# Patient Record
Sex: MALE | HISPANIC OR LATINO
[De-identification: names, ages, dates, MRNs, and addresses within clinical notes are randomized per-mention and may not be internally consistent; named-entity substitution may affect disease eponyms.]

---

## 2021-11-16 ENCOUNTER — NON-APPOINTMENT (OUTPATIENT)
Age: 36
End: 2021-11-16

## 2021-11-16 PROBLEM — Z00.00 ENCOUNTER FOR PREVENTIVE HEALTH EXAMINATION: Status: ACTIVE | Noted: 2021-11-16

## 2021-11-18 ENCOUNTER — NON-APPOINTMENT (OUTPATIENT)
Age: 36
End: 2021-11-18

## 2021-11-23 ENCOUNTER — APPOINTMENT (OUTPATIENT)
Dept: COLORECTAL SURGERY | Facility: CLINIC | Age: 36
End: 2021-11-23
Payer: MEDICARE

## 2021-11-23 VITALS
HEART RATE: 82 BPM | WEIGHT: 235 LBS | BODY MASS INDEX: 32.9 KG/M2 | SYSTOLIC BLOOD PRESSURE: 120 MMHG | TEMPERATURE: 97.7 F | DIASTOLIC BLOOD PRESSURE: 81 MMHG | HEIGHT: 71 IN

## 2021-11-23 DIAGNOSIS — B97.7 PAPILLOMAVIRUS AS THE CAUSE OF DISEASES CLASSIFIED ELSEWHERE: ICD-10-CM

## 2021-11-23 DIAGNOSIS — A63.0 ANOGENITAL (VENEREAL) WARTS: ICD-10-CM

## 2021-11-23 DIAGNOSIS — Z72.51 HIGH RISK HETEROSEXUAL BEHAVIOR: ICD-10-CM

## 2021-11-23 PROCEDURE — 99203 OFFICE O/P NEW LOW 30 MIN: CPT | Mod: 25

## 2021-11-23 PROCEDURE — 46922 EXCISION OF ANAL LESION(S): CPT

## 2021-11-23 NOTE — PHYSICAL EXAM
[FreeTextEntry1] : Medical assistant present for duration of physical examination\par \par General no acute distress, alert and oriented\par Psych calm, pleasant demeanor, responding appropriately to questions\par Nonlabored breathing\par Ambulating without assistance\par Skin normal color and pigment, no visible lesions or rashes\par \par Anal pap smear performed\par \par Anorectal Exam:\par Inspection no erythema, induration or fluctuance, no skin excoriation, no fissure, no perianal skin lesions\par ALLAN nontender, no masses palpated, no blood on gloved finger\par \par Procedure: Anoscopy\par \par 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 Anoscopy was performed by passing a lighted anoscope with lubricant jelly into the anal canal and the entire anal mucosal surface was inspected. Findings included no fissure, mild to moderate internal hemorrhoids, 3-4mm lesion in right anterior anal canal concerning for condyloma\par \par Patient tolerated examination and procedure well.\par \par \par

## 2021-11-23 NOTE — PROCEDURE
[FreeTextEntry1] : Excision and Fulguration of Anal Condyloma\par \par Pre-procedure Diagnosis: Condylomatous appearing lesions in perianal region\par Post-procedure Diagnosis: Condylomatous appearing lesions in perianal region\par Procedure: Anoscopy with excision and fulguration of anal condyloma\par Anesthesia: none\par Estimated blood loss: minimal\par Specimen: perianal lesion - rule out anal condyloma\par Drain: none\par Complications: none\par \par Indications: Patient presents with history of abnormal pap smear. On anoscopy, a condylomatous appearing lesion was visualized. Risks/benefits/alternatives were discussed and patient agreed to proceed with office based procedure.\par \par Procedure Details: Consent obtained. Patient was placed in a modified prone bonny-knife position on the examination table. Anoscopy was performed and condylomatous appearing lesion was seen in the right anterior anal canal. An excisional biopsy was performed to remove the lesion, which was sent to pathology as specimen. The base of the wound was fulgurated using electrocautery. Hemostasis was achieved.  The patient tolerated the procedure well.\par \par Post-procedure instructions were reviewed with the patient, including local wound care. All questions answered.\par

## 2021-11-23 NOTE — HISTORY OF PRESENT ILLNESS
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 Elida Hollis\par H/o anxiety/depression\par \par Anal pap smear completed with PCP at the beginning of this month, results showed ASCUS. Previous anal pap smear was 2-3 years ago and was normal per patient \par \par Denies rectal pain, bleeding or itching\par Treated for chlamydia 2 weeks ago and Gonorrhea yesterday with Ceftriaxone and 10 day course of Levofloxacin \par Denies h/o HSV or anogenital condyloma \par BH:BID\par Denies constipation, diarrhea or straining\par No use of stool softeners, fiber supplements or laxatives\par Admits to limited dietary fiber intake but recently added prunes daily. Drinking 4-5 bottles of water daily \par MSM, (+) anal receptive sex and anal play. \par HIV (+) on Biktarvy. CD4-1171 , VL- undetectable  \par Completed 1/3 Gardasil vaccine series\par Never had a colonoscopy\par Denies FMH of IBD. Believes paternal grandmother passed from colorectal cancer\par No ASA last 7 days. Took Ibuprofen last night

## 2021-11-23 NOTE — ASSESSMENT
[FreeTextEntry1] : Exam findings and diagnosis were discussed at length with patient.\par I have reviewed with the patient the clinical and natural history of human papilloma virus and its relationship to the anus. The risks and associated consequences including sexual transmission, anal warts, anal dysplasia and risk of anal cancer have been outlined. The need for long term surveillance and follow up have been detailed. The treatment options were discussed.  The patient wishes to proceed with surveillance and management of identified visible/palpable lesions if identified.\par Anal pap smear performed today.\par Condylomatous appearing lesion excised and fulgurated today.\par Further recommendations pending cytology and pathology results.\par All questions were answered, patient expressed understanding, and is agreeable to this plan.\par \par

## 2021-11-30 ENCOUNTER — NON-APPOINTMENT (OUTPATIENT)
Age: 36
End: 2021-11-30

## 2021-11-30 LAB — ANAL PAP CYTOLOGY: NORMAL

## 2025-06-27 ENCOUNTER — APPOINTMENT (OUTPATIENT)
Dept: ULTRASOUND IMAGING | Facility: HOSPITAL | Age: 40
End: 2025-06-27
Payer: MEDICAID

## 2025-06-27 ENCOUNTER — OUTPATIENT (OUTPATIENT)
Dept: OUTPATIENT SERVICES | Facility: HOSPITAL | Age: 40
LOS: 1 days | End: 2025-06-27

## 2025-06-27 ENCOUNTER — RESULT REVIEW (OUTPATIENT)
Age: 40
End: 2025-06-27

## 2025-06-27 ENCOUNTER — APPOINTMENT (OUTPATIENT)
Facility: CLINIC | Age: 40
End: 2025-06-27
Payer: MEDICARE

## 2025-06-27 ENCOUNTER — TRANSCRIPTION ENCOUNTER (OUTPATIENT)
Age: 40
End: 2025-06-27

## 2025-06-27 VITALS
HEIGHT: 71 IN | WEIGHT: 258 LBS | BODY MASS INDEX: 36.12 KG/M2 | HEART RATE: 112 BPM | TEMPERATURE: 97.3 F | SYSTOLIC BLOOD PRESSURE: 116 MMHG | RESPIRATION RATE: 17 BRPM | DIASTOLIC BLOOD PRESSURE: 84 MMHG | OXYGEN SATURATION: 98 %

## 2025-06-27 PROBLEM — R73.03 PREDIABETES: Status: ACTIVE | Noted: 2025-06-27

## 2025-06-27 PROBLEM — E66.01 MORBID (SEVERE) OBESITY DUE TO EXCESS CALORIES: Status: ACTIVE | Noted: 2025-06-27

## 2025-06-27 PROBLEM — Z78.9 NON-SMOKER: Status: ACTIVE | Noted: 2025-06-27

## 2025-06-27 PROBLEM — R79.89 LOW TESTOSTERONE LEVEL IN MALE: Status: ACTIVE | Noted: 2025-06-27

## 2025-06-27 PROBLEM — F31.60 BIPOLAR DISORDER, MIXED: Status: ACTIVE | Noted: 2025-06-27

## 2025-06-27 PROBLEM — E78.00 ELEVATED LDL CHOLESTEROL LEVEL: Status: ACTIVE | Noted: 2025-06-27

## 2025-06-27 PROBLEM — Z21 HIV (HUMAN IMMUNODEFICIENCY VIRUS) INFECTION: Status: ACTIVE | Noted: 2025-06-27

## 2025-06-27 PROCEDURE — 76705 ECHO EXAM OF ABDOMEN: CPT | Mod: 26

## 2025-06-27 PROCEDURE — 76705 ECHO EXAM OF ABDOMEN: CPT

## 2025-06-27 PROCEDURE — 99204 OFFICE O/P NEW MOD 45 MIN: CPT

## 2025-06-27 RX ORDER — BENZTROPINE MESYLATE 2 MG/1
2 TABLET ORAL
Refills: 0 | Status: ACTIVE | COMMUNITY

## 2025-06-27 RX ORDER — CLOMIPHENE CITRATE 50 MG/1
TABLET ORAL
Refills: 0 | Status: ACTIVE | COMMUNITY

## 2025-06-27 RX ORDER — METFORMIN ER 500 MG 500 MG/1
500 TABLET ORAL
Refills: 0 | Status: ACTIVE | COMMUNITY

## 2025-06-27 RX ORDER — LURASIDONE HYDROCHLORIDE 120 MG/1
120 TABLET, FILM COATED ORAL
Refills: 0 | Status: ACTIVE | COMMUNITY

## 2025-06-27 RX ORDER — QUETIAPINE FUMARATE 300 MG/1
300 TABLET ORAL
Refills: 0 | Status: ACTIVE | COMMUNITY

## 2025-06-27 RX ORDER — BICTEGRAVIR SODIUM, EMTRICITABINE, AND TENOFOVIR ALAFENAMIDE FUMARATE 50; 200; 25 MG/1; MG/1; MG/1
50-200-25 TABLET ORAL
Refills: 0 | Status: ACTIVE | COMMUNITY

## 2025-07-07 ENCOUNTER — APPOINTMENT (OUTPATIENT)
Dept: BARIATRICS | Facility: CLINIC | Age: 40
End: 2025-07-07

## 2025-07-17 ENCOUNTER — APPOINTMENT (OUTPATIENT)
Dept: BARIATRICS | Facility: CLINIC | Age: 40
End: 2025-07-17
Payer: MEDICARE

## 2025-07-17 VITALS — HEIGHT: 71 IN | WEIGHT: 258 LBS | BODY MASS INDEX: 36.12 KG/M2

## 2025-07-17 PROCEDURE — 97802 MEDICAL NUTRITION INDIV IN: CPT | Mod: 2W

## 2025-08-07 ENCOUNTER — NON-APPOINTMENT (OUTPATIENT)
Age: 40
End: 2025-08-07

## 2025-08-11 ENCOUNTER — APPOINTMENT (OUTPATIENT)
Dept: PULMONOLOGY | Facility: CLINIC | Age: 40
End: 2025-08-11
Payer: MEDICARE

## 2025-08-11 ENCOUNTER — LABORATORY RESULT (OUTPATIENT)
Age: 40
End: 2025-08-11

## 2025-08-11 VITALS
TEMPERATURE: 97.8 F | SYSTOLIC BLOOD PRESSURE: 114 MMHG | RESPIRATION RATE: 15 BRPM | HEART RATE: 113 BPM | HEIGHT: 71 IN | OXYGEN SATURATION: 96 % | DIASTOLIC BLOOD PRESSURE: 84 MMHG | WEIGHT: 262.38 LBS | BODY MASS INDEX: 36.73 KG/M2

## 2025-08-11 DIAGNOSIS — R06.83 SNORING: ICD-10-CM

## 2025-08-11 DIAGNOSIS — Z80.1 FAMILY HISTORY OF MALIGNANT NEOPLASM OF TRACHEA, BRONCHUS AND LUNG: ICD-10-CM

## 2025-08-11 DIAGNOSIS — J45.30 MILD PERSISTENT ASTHMA, UNCOMPLICATED: ICD-10-CM

## 2025-08-11 PROCEDURE — 99203 OFFICE O/P NEW LOW 30 MIN: CPT

## 2025-08-12 ENCOUNTER — NON-APPOINTMENT (OUTPATIENT)
Age: 40
End: 2025-08-12

## 2025-08-12 LAB
A ALTERNATA IGE QN: <0.1 KUA/L
A FUMIGATUS IGE QN: <0.1 KUA/L
BASOPHILS # BLD AUTO: 0.04 K/UL
BASOPHILS NFR BLD AUTO: 0.8 %
C ALBICANS IGE QN: <0.1 KUA/L
C HERBARUM IGE QN: <0.1 KUA/L
CAT DANDER IGE QN: <0.1 KUA/L
COMMON RAGWEED IGE QN: <0.1 KUA/L
D FARINAE IGE QN: <0.1 KUA/L
D PTERONYSS IGE QN: <0.1 KUA/L
DEPRECATED A ALTERNATA IGE RAST QL: 0
DEPRECATED A FUMIGATUS IGE RAST QL: 0
DEPRECATED C ALBICANS IGE RAST QL: 0
DEPRECATED C HERBARUM IGE RAST QL: 0
DEPRECATED CAT DANDER IGE RAST QL: 0
DEPRECATED COMMON RAGWEED IGE RAST QL: 0
DEPRECATED D FARINAE IGE RAST QL: 0
DEPRECATED D PTERONYSS IGE RAST QL: 0
DEPRECATED DOG DANDER IGE RAST QL: 0
DEPRECATED KAPPA LC FREE/LAMBDA SER: 0.7 RATIO
DEPRECATED M RACEMOSUS IGE RAST QL: 0
DEPRECATED ROACH IGE RAST QL: 0
DEPRECATED TIMOTHY IGE RAST QL: 0
DEPRECATED WHITE OAK IGE RAST QL: 0
DOG DANDER IGE QN: <0.1 KUA/L
EOSINOPHIL # BLD AUTO: 0.12 K/UL
EOSINOPHIL NFR BLD AUTO: 2.4 %
HCT VFR BLD CALC: 48.4 %
HGB BLD-MCNC: 15.8 G/DL
IGA SERPL-MCNC: 263 MG/DL
IGG SERPL-MCNC: 1029 MG/DL
IGM SERPL-MCNC: 50 MG/DL
IMM GRANULOCYTES NFR BLD AUTO: 0.2 %
KAPPA LC CSF-MCNC: 1.81 MG/DL
KAPPA LC SERPL-MCNC: 1.27 MG/DL
LYMPHOCYTES # BLD AUTO: 2.53 K/UL
LYMPHOCYTES NFR BLD AUTO: 49.8 %
M RACEMOSUS IGE QN: <0.1 KUA/L
MAN DIFF?: NORMAL
MCHC RBC-ENTMCNC: 31.7 PG
MCHC RBC-ENTMCNC: 32.6 G/DL
MCV RBC AUTO: 97 FL
MONOCYTES # BLD AUTO: 0.42 K/UL
MONOCYTES NFR BLD AUTO: 8.3 %
NEUTROPHILS # BLD AUTO: 1.96 K/UL
NEUTROPHILS NFR BLD AUTO: 38.5 %
PLATELET # BLD AUTO: 170 K/UL
RBC # BLD: 4.99 M/UL
RBC # FLD: 13.2 %
ROACH IGE QN: <0.1 KUA/L
TIMOTHY IGE QN: <0.1 KUA/L
TOTAL IGE SMQN RAST: 13 KU/L
WBC # FLD AUTO: 5.08 K/UL
WHITE OAK IGE QN: <0.1 KUA/L

## 2025-08-21 ENCOUNTER — APPOINTMENT (OUTPATIENT)
Dept: PULMONOLOGY | Facility: CLINIC | Age: 40
End: 2025-08-21

## 2025-08-22 ENCOUNTER — OUTPATIENT (OUTPATIENT)
Dept: OUTPATIENT SERVICES | Facility: HOSPITAL | Age: 40
LOS: 1 days | End: 2025-08-22
Payer: MEDICARE

## 2025-08-22 ENCOUNTER — APPOINTMENT (OUTPATIENT)
Dept: SLEEP CENTER | Facility: HOME HEALTH | Age: 40
End: 2025-08-22

## 2025-08-22 PROCEDURE — 95800 SLP STDY UNATTENDED: CPT | Mod: 26

## 2025-08-22 PROCEDURE — 95800 SLP STDY UNATTENDED: CPT

## 2025-08-25 DIAGNOSIS — G47.33 OBSTRUCTIVE SLEEP APNEA (ADULT) (PEDIATRIC): ICD-10-CM

## 2025-09-04 ENCOUNTER — APPOINTMENT (OUTPATIENT)
Dept: PULMONOLOGY | Facility: CLINIC | Age: 40
End: 2025-09-04

## 2025-09-04 PROCEDURE — 99213 OFFICE O/P EST LOW 20 MIN: CPT | Mod: 2W

## 2025-09-12 ENCOUNTER — APPOINTMENT (OUTPATIENT)
Facility: CLINIC | Age: 40
End: 2025-09-12
Payer: MEDICARE

## 2025-09-12 VITALS
OXYGEN SATURATION: 98 % | WEIGHT: 263 LBS | TEMPERATURE: 98.1 F | HEIGHT: 71 IN | HEART RATE: 105 BPM | RESPIRATION RATE: 17 BRPM | BODY MASS INDEX: 36.82 KG/M2 | DIASTOLIC BLOOD PRESSURE: 81 MMHG | SYSTOLIC BLOOD PRESSURE: 123 MMHG

## 2025-09-12 DIAGNOSIS — E78.00 PURE HYPERCHOLESTEROLEMIA, UNSPECIFIED: ICD-10-CM

## 2025-09-12 DIAGNOSIS — Z21 ASYMPTOMATIC HUMAN IMMUNODEFICIENCY VIRUS [HIV] INFECTION STATUS: ICD-10-CM

## 2025-09-12 DIAGNOSIS — Z72.51 HIGH RISK HETEROSEXUAL BEHAVIOR: ICD-10-CM

## 2025-09-12 DIAGNOSIS — R79.89 OTHER SPECIFIED ABNORMAL FINDINGS OF BLOOD CHEMISTRY: ICD-10-CM

## 2025-09-12 DIAGNOSIS — R73.03 PREDIABETES.: ICD-10-CM

## 2025-09-12 DIAGNOSIS — Z78.9 OTHER SPECIFIED HEALTH STATUS: ICD-10-CM

## 2025-09-12 DIAGNOSIS — E66.01 MORBID (SEVERE) OBESITY DUE TO EXCESS CALORIES: ICD-10-CM

## 2025-09-12 DIAGNOSIS — F31.60 BIPOLAR DISORDER, CURRENT EPISODE MIXED, UNSPECIFIED: ICD-10-CM

## 2025-09-12 DIAGNOSIS — Z80.1 FAMILY HISTORY OF MALIGNANT NEOPLASM OF TRACHEA, BRONCHUS AND LUNG: ICD-10-CM

## 2025-09-12 LAB
ALBUMIN SERPL ELPH-MCNC: 4.6 G/DL
ALP BLD-CCNC: 41 U/L
ALT SERPL-CCNC: 28 U/L
ANION GAP SERPL CALC-SCNC: 13 MMOL/L
AST SERPL-CCNC: 32 U/L
BILIRUB SERPL-MCNC: 0.2 MG/DL
BUN SERPL-MCNC: 12 MG/DL
CALCIUM SERPL-MCNC: 10.2 MG/DL
CHLORIDE SERPL-SCNC: 102 MMOL/L
CHOLEST SERPL-MCNC: 187 MG/DL
CO2 SERPL-SCNC: 26 MMOL/L
CREAT SERPL-MCNC: 1.21 MG/DL
EGFRCR SERPLBLD CKD-EPI 2021: 77 ML/MIN/1.73M2
GLUCOSE SERPL-MCNC: 95 MG/DL
HDLC SERPL-MCNC: 42 MG/DL
LDLC SERPL-MCNC: 114 MG/DL
NONHDLC SERPL-MCNC: 145 MG/DL
POTASSIUM SERPL-SCNC: 5.2 MMOL/L
PROT SERPL-MCNC: 7.1 G/DL
SODIUM SERPL-SCNC: 141 MMOL/L
TRIGL SERPL-MCNC: 175 MG/DL

## 2025-09-12 PROCEDURE — 99214 OFFICE O/P EST MOD 30 MIN: CPT

## 2025-09-12 RX ORDER — DIVALPROEX SODIUM 500 MG/1
500 TABLET, DELAYED RELEASE ORAL 3 TIMES DAILY
Refills: 0 | Status: ACTIVE | COMMUNITY
Start: 2025-09-12

## 2025-09-13 LAB
25(OH)D3 SERPL-MCNC: 24 NG/ML
APPEARANCE: CLEAR
BACTERIA: NEGATIVE /HPF
BILIRUBIN URINE: NEGATIVE
BLOOD URINE: NEGATIVE
CAST: 0 /LPF
COLOR: YELLOW
EPITHELIAL CELLS: 0 /HPF
ESTIMATED AVERAGE GLUCOSE: 111 MG/DL
GLUCOSE QUALITATIVE U: NEGATIVE MG/DL
HBA1C MFR BLD HPLC: 5.5 %
KETONES URINE: ABNORMAL MG/DL
LEUKOCYTE ESTERASE URINE: NEGATIVE
MICROSCOPIC-UA: NORMAL
NITRITE URINE: NEGATIVE
PH URINE: 7.5
PROTEIN URINE: NEGATIVE MG/DL
RED BLOOD CELLS URINE: 2 /HPF
SPECIFIC GRAVITY URINE: 1.02
TSH SERPL-ACNC: 1.58 UIU/ML
UROBILINOGEN URINE: 0.2 MG/DL
VIT B12 SERPL-MCNC: 756 PG/ML
WHITE BLOOD CELLS URINE: 0 /HPF

## 2025-09-18 LAB — VIT B1 SERPL-MCNC: 156.6 NMOL/L
